# Patient Record
Sex: FEMALE | Race: BLACK OR AFRICAN AMERICAN | NOT HISPANIC OR LATINO | URBAN - METROPOLITAN AREA
[De-identification: names, ages, dates, MRNs, and addresses within clinical notes are randomized per-mention and may not be internally consistent; named-entity substitution may affect disease eponyms.]

---

## 2021-10-28 ENCOUNTER — EMERGENCY (EMERGENCY)
Facility: HOSPITAL | Age: 76
LOS: 1 days | Discharge: HOME | End: 2021-10-28
Attending: STUDENT IN AN ORGANIZED HEALTH CARE EDUCATION/TRAINING PROGRAM | Admitting: STUDENT IN AN ORGANIZED HEALTH CARE EDUCATION/TRAINING PROGRAM
Payer: MEDICARE

## 2021-10-28 VITALS
SYSTOLIC BLOOD PRESSURE: 186 MMHG | HEART RATE: 67 BPM | DIASTOLIC BLOOD PRESSURE: 87 MMHG | OXYGEN SATURATION: 100 % | RESPIRATION RATE: 99 BRPM

## 2021-10-28 VITALS
TEMPERATURE: 97 F | SYSTOLIC BLOOD PRESSURE: 200 MMHG | DIASTOLIC BLOOD PRESSURE: 90 MMHG | HEART RATE: 72 BPM | RESPIRATION RATE: 18 BRPM | OXYGEN SATURATION: 100 %

## 2021-10-28 LAB
ALBUMIN SERPL ELPH-MCNC: 4.7 G/DL — SIGNIFICANT CHANGE UP (ref 3.3–5)
ALP SERPL-CCNC: 81 U/L — SIGNIFICANT CHANGE UP (ref 40–120)
ALT FLD-CCNC: 19 U/L — SIGNIFICANT CHANGE UP (ref 4–33)
ANION GAP SERPL CALC-SCNC: 16 MMOL/L — HIGH (ref 7–14)
AST SERPL-CCNC: 18 U/L — SIGNIFICANT CHANGE UP (ref 4–32)
BASOPHILS # BLD AUTO: 0.02 K/UL — SIGNIFICANT CHANGE UP (ref 0–0.2)
BASOPHILS NFR BLD AUTO: 0.3 % — SIGNIFICANT CHANGE UP (ref 0–2)
BILIRUB SERPL-MCNC: 0.5 MG/DL — SIGNIFICANT CHANGE UP (ref 0.2–1.2)
BUN SERPL-MCNC: 19 MG/DL — SIGNIFICANT CHANGE UP (ref 7–23)
CALCIUM SERPL-MCNC: 10.2 MG/DL — SIGNIFICANT CHANGE UP (ref 8.4–10.5)
CHLORIDE SERPL-SCNC: 103 MMOL/L — SIGNIFICANT CHANGE UP (ref 98–107)
CO2 SERPL-SCNC: 21 MMOL/L — LOW (ref 22–31)
CREAT SERPL-MCNC: 1.48 MG/DL — HIGH (ref 0.5–1.3)
EOSINOPHIL # BLD AUTO: 0.13 K/UL — SIGNIFICANT CHANGE UP (ref 0–0.5)
EOSINOPHIL NFR BLD AUTO: 2.2 % — SIGNIFICANT CHANGE UP (ref 0–6)
GLUCOSE SERPL-MCNC: 114 MG/DL — HIGH (ref 70–99)
HCT VFR BLD CALC: 38.9 % — SIGNIFICANT CHANGE UP (ref 34.5–45)
HGB BLD-MCNC: 12.5 G/DL — SIGNIFICANT CHANGE UP (ref 11.5–15.5)
IANC: 2.96 K/UL — SIGNIFICANT CHANGE UP (ref 1.5–8.5)
IMM GRANULOCYTES NFR BLD AUTO: 0.2 % — SIGNIFICANT CHANGE UP (ref 0–1.5)
LYMPHOCYTES # BLD AUTO: 2.23 K/UL — SIGNIFICANT CHANGE UP (ref 1–3.3)
LYMPHOCYTES # BLD AUTO: 38.2 % — SIGNIFICANT CHANGE UP (ref 13–44)
MCHC RBC-ENTMCNC: 25.9 PG — LOW (ref 27–34)
MCHC RBC-ENTMCNC: 32.1 GM/DL — SIGNIFICANT CHANGE UP (ref 32–36)
MCV RBC AUTO: 80.7 FL — SIGNIFICANT CHANGE UP (ref 80–100)
MONOCYTES # BLD AUTO: 0.49 K/UL — SIGNIFICANT CHANGE UP (ref 0–0.9)
MONOCYTES NFR BLD AUTO: 8.4 % — SIGNIFICANT CHANGE UP (ref 2–14)
NEUTROPHILS # BLD AUTO: 2.96 K/UL — SIGNIFICANT CHANGE UP (ref 1.8–7.4)
NEUTROPHILS NFR BLD AUTO: 50.7 % — SIGNIFICANT CHANGE UP (ref 43–77)
NRBC # BLD: 0 /100 WBCS — SIGNIFICANT CHANGE UP
NRBC # FLD: 0 K/UL — SIGNIFICANT CHANGE UP
PLATELET # BLD AUTO: 352 K/UL — SIGNIFICANT CHANGE UP (ref 150–400)
POTASSIUM SERPL-MCNC: 4.5 MMOL/L — SIGNIFICANT CHANGE UP (ref 3.5–5.3)
POTASSIUM SERPL-SCNC: 4.5 MMOL/L — SIGNIFICANT CHANGE UP (ref 3.5–5.3)
PROT SERPL-MCNC: 8.2 G/DL — SIGNIFICANT CHANGE UP (ref 6–8.3)
RBC # BLD: 4.82 M/UL — SIGNIFICANT CHANGE UP (ref 3.8–5.2)
RBC # FLD: 14.5 % — SIGNIFICANT CHANGE UP (ref 10.3–14.5)
SODIUM SERPL-SCNC: 140 MMOL/L — SIGNIFICANT CHANGE UP (ref 135–145)
WBC # BLD: 5.84 K/UL — SIGNIFICANT CHANGE UP (ref 3.8–10.5)
WBC # FLD AUTO: 5.84 K/UL — SIGNIFICANT CHANGE UP (ref 3.8–10.5)

## 2021-10-28 PROCEDURE — 99285 EMERGENCY DEPT VISIT HI MDM: CPT | Mod: 25

## 2021-10-28 PROCEDURE — 93010 ELECTROCARDIOGRAM REPORT: CPT

## 2021-10-28 PROCEDURE — 70450 CT HEAD/BRAIN W/O DYE: CPT | Mod: 26,MA

## 2021-10-28 NOTE — ED PROVIDER NOTE - NSFOLLOWUPINSTRUCTIONS_ED_ALL_ED_FT
Follow up with your primary care doctor within 1 week, discuss creatinine level and have labs repeated  Drink plenty of fluids  Return to the ER with any worsening or concerning symptoms, weakness, numbness, difficulty speaking or swallowing, difficulty walking or any other concerns. Follow up with your primary care doctor within 1 week, discuss creatinine level and have labs repeated. discuss your blood pressure medication  Follow up with a neurologist, you already have an appointment scheduled  Drink plenty of fluids  Return to the ER with any worsening or concerning symptoms, weakness, numbness, difficulty speaking or swallowing, difficulty walking or any other concerns.

## 2021-10-28 NOTE — ED PROVIDER NOTE - CLINICAL SUMMARY MEDICAL DECISION MAKING FREE TEXT BOX
76yF w/pmhx DM2, HTN, HLD, hypothyroid, remote hx uterine cancer s/p hysterectomy presenting with intermittent "wave sensation" across face/head and tingling to face x 1 week. Pt saw her PMD yesterday and was given neuro referral, had appt scheduled for November. Pt is well appearing, non focal neuro exam, ambulating without difficulty, no symptoms at present. Low suspicion for stroke, as symptoms occur with positional change possible orthostatic? vertigo less likely, no associated dizziness. Plan: cbc/cmp, CT head, check orthostatic vital signs.

## 2021-10-28 NOTE — ED PROVIDER NOTE - PATIENT PORTAL LINK FT
You can access the FollowMyHealth Patient Portal offered by [facility] by registering at the following website: [email or web address]. By joining POS on [facility]’s FollowMyHealth portal, you will also be able to view your health information using other applications (apps) compatible with our system.

## 2021-10-28 NOTE — ED PROVIDER NOTE - NSICDXPASTMEDICALHX_GEN_ALL_CORE_FT
PAST MEDICAL HISTORY:  DM2 (diabetes mellitus, type 2)     HLD (hyperlipidemia)     HTN (hypertension)     Hypothyroid

## 2021-10-28 NOTE — ED ADULT NURSE NOTE - OBJECTIVE STATEMENT
Pt. is A&Ox4 presents to ER c/o left sided face tingling and "shocks" for two weeks. Was seen at urgent care last week for similar symptoms. States she spoke to her PCP, PCP scheduled neurology appointment for end of November however patient states appointment is far away. States history of DM2 and HTN. Noted facial symmetry, strengths 5/5 on bilateral UE and LE, respirations even and unlabored. Safety maintained.

## 2021-10-28 NOTE — ED PROVIDER NOTE - ATTENDING CONTRIBUTION TO CARE
I have personally performed a face to face medical and diagnostic evaluation of the patient. I have discussed with and reviewed the ACP's note and agree with the History, ROS, Physical Exam and MDM unless otherwise indicated. A brief summary of my personal evaluation and impression can be found below.    75yo F hx htn hld dm remote hx uterine CA p/w "wave sensation" across forehead 1 week ago, resolved, and now having similar "touching" sensation on R side of face. Otherwise no trauma, vision changes, focal weakness/numbness, facial droop, n/v/, HA, cp, neck pain. Asymptomatic currently.  VITALS: Initial triage and subsequent vitals have been reviewed by me.  Gen: Well appearing, NAD, alert, non-toxic  Head: NCAT  HEENT: PERRL, MMM, normal conjunctiva, anicteric, neck supple  Lung: CTAB, no adventitious sounds  CV: RRR, no murmurs, 2+symmetric peripheral pulses  Abd: soft, NTND, no rebound or guarding, no palpable masses  MSK: No edema, no visible deformities  Neuro: Following commands appropriately, fluid speech, CN II-XII grossly intact. 5/5 strength and normal sensation in all extremities. Ambulatory with stable gait.  Skin: Warm and dry, no evidence of rash  Psych: normal mood and affect w  Intermittent parasthesias to face neuro intact w/ benign PE very low suspicion CVA. Willg et labs, CT, reassess and likely dc to fu with pmd for further eval

## 2021-10-28 NOTE — ED ADULT NURSE NOTE - CHIEF COMPLAINT QUOTE
Patient went to her PMD for c/o a wave across her face and forehead. Pt went to urgent care last week for same. She had said that her face felt funny, AND SHE FELT LIKE SOME KIND OF SHOCK in her head or that something was  IS DRAINING inside. Was given papers to make an appt with neurologist but she could not get one until the end of November.  Daughter: Marielle: [phone]./Wild: [phone]  Type 2 DM :

## 2021-10-28 NOTE — ED PROVIDER NOTE - OBJECTIVE STATEMENT
76yF w/pmhx DM2, HTN, HLD, hypothyroid, remote hx uterine cancer s/p hysterectomy presenting with intermittent "wave sensation" across face and tingling to face. Pt states over the past week she intermittently 76yF w/pmhx DM2, HTN, HLD, hypothyroid, remote hx uterine cancer s/p hysterectomy presenting with intermittent "wave sensation" across face and tingling to face. Pt states over the past week she intermittently feels a "wave like sensation" across her face, mostly occurs when she goes from sitting to standing position. At times she does feel lightheaded, reports intermittently feeling off balance the past week. Pt states she saw her PMD yesterday and was given a neuro referral, has appointment Nov 28th. Pt denies chest pain, sob, weakness, abd pain, n/v/d, visual changes, difficulty speaking or swallowing, dizziness, recent travel or illness or any other concerns. 76yF w/pmhx DM2, HTN, HLD, hypothyroid, remote hx uterine cancer s/p hysterectomy presenting with intermittent "wave sensation" across face/head and tingling to face x 1 week. Pt states over the past week she intermittently feels a "wave like sensation" across her face, mostly occurs when she goes from sitting to standing position. At times she does feel lightheaded, reports intermittently feeling off balance the past week. Pt states she saw her PMD yesterday and was given a neuro referral, has appointment Nov 28th. Pt denies chest pain, sob, weakness, abd pain, n/v/d, visual changes, difficulty speaking or swallowing, dizziness, visual changes, blurry vision, recent travel or illness or any other concerns.

## 2021-10-28 NOTE — ED ADULT NURSE NOTE - NSIMPLEMENTINTERV_GEN_ALL_ED
Implemented All Fall Risk Interventions:  [place] to call system. Call bell, personal items and telephone within reach. Instruct patient to call for assistance. Room bathroom lighting operational. Non-slip footwear when patient is off stretcher. Physically safe environment: no spills, clutter or unnecessary equipment. Stretcher in lowest position, wheels locked, appropriate side rails in place. Provide visual cue, wrist band, yellow gown, etc. Monitor gait and stability. Monitor for mental status changes and reorient to person, place, and time. Review medications for side effects contributing to fall risk. Reinforce activity limits and safety measures with patient and family.

## 2021-10-28 NOTE — ED PROVIDER NOTE - PROGRESS NOTE DETAILS
MARILYN Schwartz: CT head without acute findings, labs reviewed Cr 1.48, discussed with patient, states her PMD has told her to drink more water in the past unsure if creatinine has been elevated in the past. Pt is asymptomatic at present. Will d/c home with pts PMD offered and refused IV fluids MARILYN Schwartz: CT head without acute findings, labs reviewed Cr 1.48, discussed with patient, states her PMD has told her to drink more water in the past unsure if creatinine has been elevated in the past. Pt offered IV fluids and refused, states she will drink water when she is home. Pt is asymptomatic at present. Will d/c home with pts PMD, pt to discuss blood pressure management with her PMD, states she took her medication this morning and will take her PM doses when she is home. Discussed all results with patients daughters as well, verbalized understanding of discharge instructions. MARILYN Schwartz: CT head without acute findings, labs reviewed Cr 1.48, discussed with patient, states her PMD has told her to drink more water in the past unsure if creatinine has been elevated in the past. Pt offered IV fluids and refused, states she will drink water when she is home. Pt is asymptomatic at present. Will d/c home with pts PMD, pt to discuss blood pressure management with her PMD, states she took her medication this morning and will take her PM doses when she is home. Discussed all results with patients daughters as well, verbalized understanding of discharge instructions. Discharge discussed with ED attg.